# Patient Record
Sex: FEMALE | Race: WHITE | NOT HISPANIC OR LATINO | Employment: STUDENT | URBAN - METROPOLITAN AREA
[De-identification: names, ages, dates, MRNs, and addresses within clinical notes are randomized per-mention and may not be internally consistent; named-entity substitution may affect disease eponyms.]

---
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
## 2024-02-12 ENCOUNTER — PATIENT MESSAGE (OUTPATIENT)
Dept: OBGYN CLINIC | Facility: MEDICAL CENTER | Age: 19
End: 2024-02-12

## 2024-02-12 ENCOUNTER — TELEPHONE (OUTPATIENT)
Age: 19
End: 2024-02-12

## 2024-02-12 NOTE — TELEPHONE ENCOUNTER
Patient is transferring to [place] but will not be seen until march. Since she is a patient of [facility] she is requesting a refill through the [email or web address] home delivery

## 2024-02-22 ENCOUNTER — TELEPHONE (OUTPATIENT)
Age: 19
End: 2024-02-22

## 2024-02-22 NOTE — TELEPHONE ENCOUNTER
I spoke with Patient's mother regarding Juana's Rx for ocps , the mother did not know there was refills for the year. She also mentioned that her Rx home service has changed , [facility]   Patient's mother will call if there are and problems with the transfer

## 2024-02-23 DIAGNOSIS — Z30.011 ENCOUNTER FOR INITIAL PRESCRIPTION OF CONTRACEPTIVE PILLS: ICD-10-CM

## 2024-02-23 RX ORDER — ETHYNODIOL DIACETATE AND ETHINYL ESTRADIOL 1 MG-35MCG
1 KIT ORAL DAILY
Qty: 84 TABLET | Refills: 0 | Status: CANCELLED | OUTPATIENT
Start: 2024-02-23

## 2024-02-27 DIAGNOSIS — Z30.011 ENCOUNTER FOR INITIAL PRESCRIPTION OF CONTRACEPTIVE PILLS: ICD-10-CM

## 2024-02-27 RX ORDER — ETHYNODIOL DIACETATE AND ETHINYL ESTRADIOL 1 MG-35MCG
1 KIT ORAL DAILY
Qty: 84 TABLET | Refills: 3 | Status: SHIPPED | OUTPATIENT
Start: 2024-02-27

## 2024-02-27 RX ORDER — ETHYNODIOL DIACETATE AND ETHINYL ESTRADIOL 1 MG-35MCG
1 KIT ORAL DAILY
Qty: 84 TABLET | Refills: 3 | Status: CANCELLED | OUTPATIENT
Start: 2024-02-27

## 2024-02-27 NOTE — TELEPHONE ENCOUNTER
Patient's pharmacy has changed. She needs a 1 month script for her birthcontrol sent to [facility] in [place].

## 2024-03-14 ENCOUNTER — OFFICE VISIT (OUTPATIENT)
Age: 19
End: 2024-03-14
Payer: COMMERCIAL

## 2024-03-14 VITALS
DIASTOLIC BLOOD PRESSURE: 64 MMHG | WEIGHT: 206.6 LBS | SYSTOLIC BLOOD PRESSURE: 122 MMHG | BODY MASS INDEX: 35.27 KG/M2 | HEIGHT: 64 IN

## 2024-03-14 DIAGNOSIS — L73.2 HIDRADENITIS SUPPURATIVA: ICD-10-CM

## 2024-03-14 DIAGNOSIS — M05.761 RHEUMATOID ARTHRITIS INVOLVING BOTH KNEES WITH POSITIVE RHEUMATOID FACTOR (HCC): ICD-10-CM

## 2024-03-14 DIAGNOSIS — Z01.419 ENCOUNTER FOR ANNUAL ROUTINE GYNECOLOGICAL EXAMINATION: Primary | ICD-10-CM

## 2024-03-14 DIAGNOSIS — Z30.41 ENCOUNTER FOR SURVEILLANCE OF CONTRACEPTIVE PILLS: ICD-10-CM

## 2024-03-14 DIAGNOSIS — M05.762 RHEUMATOID ARTHRITIS INVOLVING BOTH KNEES WITH POSITIVE RHEUMATOID FACTOR (HCC): ICD-10-CM

## 2024-03-14 DIAGNOSIS — Z11.3 SCREENING FOR STD (SEXUALLY TRANSMITTED DISEASE): ICD-10-CM

## 2024-03-14 PROBLEM — M06.9 RHEUMATOID ARTHRITIS (HCC): Status: ACTIVE | Noted: 2023-09-08

## 2024-03-14 PROBLEM — M05.769: Status: ACTIVE | Noted: 2023-09-08

## 2024-03-14 PROBLEM — I01.1 RHEUMATOID AORTITIS: Status: ACTIVE | Noted: 2024-03-14

## 2024-03-14 PROBLEM — I01.1 RHEUMATOID AORTITIS: Status: RESOLVED | Noted: 2024-03-14 | Resolved: 2024-03-14

## 2024-03-14 PROCEDURE — 87591 N.GONORRHOEAE DNA AMP PROB: CPT | Performed by: OBSTETRICS & GYNECOLOGY

## 2024-03-14 PROCEDURE — 87491 CHLMYD TRACH DNA AMP PROBE: CPT | Performed by: OBSTETRICS & GYNECOLOGY

## 2024-03-14 PROCEDURE — S0610 ANNUAL GYNECOLOGICAL EXAMINA: HCPCS | Performed by: OBSTETRICS & GYNECOLOGY

## 2024-03-14 RX ORDER — CLINDAMYCIN PHOSPHATE 10 MG/G
1 GEL TOPICAL 2 TIMES DAILY
COMMUNITY
Start: 2023-06-02 | End: 2024-06-01

## 2024-03-14 RX ORDER — ETHYNODIOL DIACETATE AND ETHINYL ESTRADIOL 1 MG-35MCG
1 KIT ORAL DAILY
Qty: 84 TABLET | Refills: 3 | Status: SHIPPED | OUTPATIENT
Start: 2024-03-14

## 2024-03-14 RX ORDER — MULTIVITAMIN
TABLET ORAL
COMMUNITY

## 2024-03-14 RX ORDER — ADALIMUMAB 40MG/0.4ML
KIT SUBCUTANEOUS
COMMUNITY

## 2024-03-14 NOTE — PROGRESS NOTES
Assessment/Plan:    1. Encounter for annual routine gynecological examination      2. Screening for STD (sexually transmitted disease)    - Chlamydia/GC amplified DNA by PCR    3. Hidradenitis suppurativa    - Ambulatory Referral to Dermatology; Future    4. Rheumatoid arthritis involving both knees with positive rheumatoid factor (HCC)      5. Encounter for surveillance of contraceptive pills    - ethynodiol-ethinyl estradiol (Kelnor 1/35) 1-35 MG-MCG per tablet; Take 1 tablet by mouth daily  Dispense: 84 tablet; Refill: 3      Subjective      Juana Diaz is a 18 y.o. female who presents for annual exam. Periods are regular on current OCP.  She c/o HS over chest and genital region.  Has been using clindamycin gel on chest.  Sexually active - accepts STD screening.     Current contraception: OCP (estrogen/progesterone)  History of abnormal Pap smear: no  Regular self breast exam: yes  History of abnormal mammogram: no  History of abnormal lipids: no    Menstrual History:  Nulligravida  Patient's last menstrual period was 02/27/2024 (exact date).  Period Cycle (Days): 28  Period Duration (Days): 4  Period Pattern: Regular  Menstrual Flow: Moderate, Heavy (Heavy x 2 days)  Menstrual Control: Tampon, Maxi pad  Menstrual Control Change Freq (Hours): 4-5  Dysmenorrhea: (!) Moderate  Dysmenorrhea Symptoms: Cramping, Headache, Other (Comment) (Breast pain, fatigue)    Past Medical History:   Diagnosis Date    Rheumatoid arthritis ([facility]) 2023       Family History   Problem Relation Age of Onset    Hyperlipidemia Mother     Hyperlipidemia Father     Mental illness Sister     Celiac disease Sister        The following portions of the patient's history were reviewed and updated as appropriate: allergies, current medications, past family history, past medical history, past social history, past surgical history, and problem list.    Review of Systems  Pertinent items are noted in HPI.      Objective      /64 (BP Location:  "Right arm, Patient Position: Sitting, Cuff Size: Large)   Ht 5' 4.25\" (1.632 m)   Wt 93.7 kg (206 lb 9.6 oz)   LMP 02/27/2024 (Exact Date)   BMI 35.19 kg/m²     General:   alert and oriented, in no acute distress   Heart:  Breasts: regular rate and rhythm  appear normal, no suspicious masses, no skin or nipple changes or axillary nodes; + scarring from HS   Lungs: Effort normal   Abdomen: soft, non-tender, without masses or organomegaly   Vulva: Normal; + HS lesions with scarring   Vagina: normal mucosa   Cervix: no lesions   Uterus: normal size, mobile, non-tender   Adnexa: normal adnexa and no mass, fullness, tenderness               "

## 2024-03-15 LAB
C TRACH DNA SPEC QL NAA+PROBE: NEGATIVE
N GONORRHOEA DNA SPEC QL NAA+PROBE: NEGATIVE

## 2024-07-26 DIAGNOSIS — Z30.41 ENCOUNTER FOR SURVEILLANCE OF CONTRACEPTIVE PILLS: ICD-10-CM

## 2024-07-26 RX ORDER — ETHYNODIOL DIACETATE AND ETHINYL ESTRADIOL 1 MG-35MCG
1 KIT ORAL DAILY
Qty: 84 TABLET | Refills: 3 | Status: SHIPPED | OUTPATIENT
Start: 2024-07-26

## 2025-03-17 ENCOUNTER — ANNUAL EXAM (OUTPATIENT)
Age: 20
End: 2025-03-17
Payer: COMMERCIAL

## 2025-03-17 VITALS
BODY MASS INDEX: 36.22 KG/M2 | SYSTOLIC BLOOD PRESSURE: 122 MMHG | WEIGHT: 217.4 LBS | HEIGHT: 65 IN | DIASTOLIC BLOOD PRESSURE: 62 MMHG

## 2025-03-17 DIAGNOSIS — Z01.419 ENCOUNTER FOR GYNECOLOGICAL EXAMINATION (GENERAL) (ROUTINE) WITHOUT ABNORMAL FINDINGS: Primary | ICD-10-CM

## 2025-03-17 DIAGNOSIS — Z30.41 ENCOUNTER FOR SURVEILLANCE OF CONTRACEPTIVE PILLS: ICD-10-CM

## 2025-03-17 PROCEDURE — S0612 ANNUAL GYNECOLOGICAL EXAMINA: HCPCS | Performed by: OBSTETRICS & GYNECOLOGY

## 2025-03-17 RX ORDER — ETHYNODIOL DIACETATE AND ETHINYL ESTRADIOL 1 MG-35MCG
1 KIT ORAL DAILY
Qty: 84 TABLET | Refills: 3 | Status: SHIPPED | OUTPATIENT
Start: 2025-03-17

## 2025-03-17 RX ORDER — SECUKINUMAB 300 MG/2ML
INJECTION SUBCUTANEOUS
COMMUNITY
Start: 2024-06-17

## 2025-03-17 NOTE — PROGRESS NOTES
"Assessment/Plan:    1. Encounter for surveillance of contraceptive pills    - ethynodiol-ethinyl estradiol (Kelnor 1/35) 1-35 MG-MCG per tablet; Take 1 tablet by mouth daily  Dispense: 84 tablet; Refill: 3    2. Encounter for gynecological examination (general) (routine) without abnormal findings (Primary)          Subjective      Juana Diaz is a 19 y.o. female who presents for annual exam. Periods are well-controlled on current OCP.  She denies any breast concerns.  Not currently sexually active. HS under better control with Consentx and clindamycin gel.    Current contraception: OCP (estrogen/progesterone)  History of abnormal Pap smear: no  Regular self breast exam: yes  History of abnormal mammogram: no  History of abnormal lipids: no    Menstrual History:  Nulligravida  Menarche age: 11  Patient's last menstrual period was 02/16/2025 (exact date).  Period Cycle (Days): 28  Period Duration (Days): 2  Period Pattern: Regular  Menstrual Flow: Moderate  Menstrual Control: Tampon, Maxi pad  Menstrual Control Change Freq (Hours): 6-8  Dysmenorrhea: (!) Moderate  Dysmenorrhea Symptoms: Cramping, Other (Comment) (Breast tenderness)    Past Medical History:   Diagnosis Date    Hidradenitis suppurativa     Rheumatoid arthritis (HCC) 2023       Family History   Problem Relation Age of Onset    Hyperlipidemia Mother     Hyperlipidemia Father     Mental illness Sister     Celiac disease Sister        The following portions of the patient's history were reviewed and updated as appropriate: allergies, current medications, past family history, past medical history, past social history, past surgical history, and problem list.    Review of Systems  Pertinent items are noted in HPI.      Objective      /62 (BP Location: Right arm, Patient Position: Sitting, Cuff Size: Large)   Ht 5' 4.5\" (1.638 m)   Wt 98.6 kg (217 lb 6.4 oz)   LMP 02/16/2025 (Exact Date)   BMI 36.74 kg/m²     General:   alert and oriented, in no " -- DO NOT REPLY / DO NOT REPLY ALL --  -- Message is from Engagement Center Operations ([facility]) --    General Patient Message: Patient is calling to get an update on her injection. Patient states she spoke with someone and was told they would get back with her on weather or not she will be receiving her second injection. Patient is needing to know as soon as possible as she has to schedule transportation. Transportation requires 48 hour notice  Please call to assist      Caller Information       Type Contact Phone/Fax    09/29/2023 11:23 AM CDT Phone (Incoming) Melanie Corbett (Self) [phone] (M)        Alternative phone number: NO    Can a detailed message be left? Yes    Message Turnaround: [place]:    Refer to site's KB page for routing instructions    Please give this turnaround time to the caller:   \"You can expect to receive a response 2-3 business days after your provider's clinical team reviews the message\"               acute distress   Heart:  Breasts: regular rate and rhythm  appear normal, no suspicious masses, no skin or nipple changes or axillary nodes, + HS under breasts   Lungs: Effort normal   Abdomen: soft, non-tender, without masses or organomegaly   Vulva: normal   Vagina: normal mucosa   Cervix: no lesions   Uterus: normal size, mobile, non-tender   Adnexa:  Bladder: normal adnexa and no mass, fullness, tenderness  Non-tender